# Patient Record
Sex: FEMALE | Race: AMERICAN INDIAN OR ALASKA NATIVE
[De-identification: names, ages, dates, MRNs, and addresses within clinical notes are randomized per-mention and may not be internally consistent; named-entity substitution may affect disease eponyms.]

---

## 2020-01-09 ENCOUNTER — HOSPITAL ENCOUNTER (EMERGENCY)
Dept: HOSPITAL 5 - ED | Age: 40
Discharge: HOME | End: 2020-01-09
Payer: SELF-PAY

## 2020-01-09 VITALS — DIASTOLIC BLOOD PRESSURE: 108 MMHG | SYSTOLIC BLOOD PRESSURE: 163 MMHG

## 2020-01-09 DIAGNOSIS — H66.92: Primary | ICD-10-CM

## 2020-01-09 DIAGNOSIS — H02.9: ICD-10-CM

## 2020-01-09 PROCEDURE — 99282 EMERGENCY DEPT VISIT SF MDM: CPT

## 2020-01-09 NOTE — EMERGENCY DEPARTMENT REPORT
ED ENT HPI





- General


Chief complaint: Sore Throat


Stated complaint: SORE THROAT/EARACHE


Time Seen by Provider: 20 10:32


Source: patient


Mode of arrival: Ambulatory


Limitations: No Limitations





- History of Present Illness


Initial comments: 





This is a 39-year-old female brought by mother nontoxic, well nourished in 

appearance, no acute signs of distress presents to the ED with c/o of left 

earache and sore throat.   Patient describes sore throat as swallowing razer 

blades.   Patient denies any ear drainage.  Patient denies any trauma to the 

area.  Patient denies any mastoid tenderness or tragus tenderness.  Patient 

denies any drooling or hoarseness.  Patient denies hearing decrease or hearing 

changes.  Patient denies any fever, chills, nausea, vomiting, chest pain, short 

of breath, headache or stiff neck.  Patient denies any drug allergies or 

significant past medical history.


MD complaint: sore throat, ear pain


-: days(s)


Location: L ear, throat


Severity: mild


Severity scale (0 -10): 8


Quality: aching


Consistency: constant


Improves with: none


Worsens with: swallowing


Associated Symptoms: pain with swallowing, sore throat.  denies: fever, cough, 

gum swelling, toothache, tinnitus, hearing loss, discharge from ear, rhinorrhea





- Related Data


                                  Previous Rx's











 Medication  Instructions  Recorded  Last Taken  Type


 


Amoxicillin [Amoxicillin TAB] 875 mg PO BID #20 tablet 20 Unknown Rx


 


Ibuprofen [Motrin] 600 mg PO Q8H PRN #20 tablet 20 Unknown Rx











                                    Allergies











Allergy/AdvReac Type Severity Reaction Status Date / Time


 


No Known Allergies Allergy   Unverified 20 09:53














ED Dental HPI





- General


Chief complaint: Sore Throat


Stated complaint: SORE THROAT/EARACHE


Time Seen by Provider: 20 10:32


Source: patient


Mode of arrival: Ambulatory


Limitations: No Limitations





- Related Data


                                  Previous Rx's











 Medication  Instructions  Recorded  Last Taken  Type


 


Amoxicillin [Amoxicillin TAB] 875 mg PO BID #20 tablet 20 Unknown Rx


 


Ibuprofen [Motrin] 600 mg PO Q8H PRN #20 tablet 20 Unknown Rx











                                    Allergies











Allergy/AdvReac Type Severity Reaction Status Date / Time


 


No Known Allergies Allergy   Unverified 20 09:53














ED Review of Systems


ROS: 


Stated complaint: SORE THROAT/EARACHE


Other details as noted in HPI





Constitutional: denies: chills, fever


Eyes: denies: eye pain, eye discharge, vision change


ENT: ear pain, throat pain


Respiratory: denies: cough, shortness of breath, wheezing


Cardiovascular: denies: chest pain, palpitations


Endocrine: no symptoms reported


Gastrointestinal: denies: abdominal pain, nausea, diarrhea


Genitourinary: denies: urgency, dysuria, discharge


Musculoskeletal: denies: back pain, joint swelling, arthralgia


Skin: denies: rash, lesions


Neurological: denies: headache, weakness, paresthesias


Psychiatric: denies: anxiety, depression


Hematological/Lymphatic: denies: easy bleeding, easy bruising





ED Past Medical Hx





- Past Medical History


Previous Medical History?: No





- Surgical History


Additional Surgical History: 





- Social History


Smoking Status: Never Smoker


Substance Use Type: None





- Medications


Home Medications: 


                                Home Medications











 Medication  Instructions  Recorded  Confirmed  Last Taken  Type


 


Amoxicillin [Amoxicillin TAB] 875 mg PO BID #20 tablet 20  Unknown Rx


 


Ibuprofen [Motrin] 600 mg PO Q8H PRN #20 tablet 20  Unknown Rx














ED Physical Exam





- General


Limitations: No Limitations


General appearance: alert, in no apparent distress





- Head


Head exam: Present: atraumatic, normocephalic





- Expanded ENT Exam


  ** Expanded


Ear exam: Present: normal external inspection


TM/Canal exam: Erythema: Left TM, Bulging: Left TM


Mouth exam: Present: normal external inspection.  Absent: drooling, trismus, 

muffled voice


Teeth exam: Present: normal inspection


Throat exam: Positive: tonsillar erythema, other (uvula midline).  Negative: 

tonsillomegaly, tonsillar exudate, R peritonsillar mass, L peritonsillar mass





- Neck


Neck exam: Present: normal inspection, full ROM.  Absent: tenderness, 

meningismus, lymphadenopathy





- Respiratory


Respiratory exam: Present: normal lung sounds bilaterally.  Absent: respiratory 

distress, wheezes, rales, rhonchi, stridor, chest wall tenderness, accessory 

muscle use, decreased breath sounds, prolonged expiratory





- Cardiovascular


Cardiovascular Exam: Present: regular rate, normal rhythm, normal heart sounds. 

 Absent: irregular rhythm, systolic murmur, diastolic murmur, rubs, gallop





- Extremities Exam


Extremities exam: Present: normal inspection, full ROM





- Back Exam


Back exam: Present: normal inspection, full ROM





- Neurological Exam


Neurological exam: Present: alert, oriented X3, normal gait





- Psychiatric


Psychiatric exam: Present: normal affect, normal mood





- Skin


Skin exam: Present: warm, dry, intact, normal color.  Absent: rash





ED Course





                                   Vital Signs











  20





  09:51 10:04


 


Temperature 98.7 F 98.7 F


 


Pulse Rate 102 H 96 H


 


Respiratory 19 16





Rate  


 


Blood Pressure 183/115 163/108


 


O2 Sat by Pulse 100 100





Oximetry  














- Reevaluation(s)


Reevaluation #1: 





20 11:29


Patient is speaking in full sentences with no signs of distress noted.





ED Medical Decision Making





- Medical Decision Making





Patient was instructed to Follow-up with a primary care doctor in 3-5 days or if

 symptoms worsen and continue return to emergency room as soon as possible.  At 

time of discharge, the patient does not seem toxic or ill in appearance.  No 

acute signs of distress noted.  Patient agrees to discharge treatment plan of 

care.  No further questions noted by the patient.


Critical care attestation.: 


If time is entered above; I have spent that time in minutes in the direct care 

of this critically ill patient, excluding procedure time.








ED Disposition


Clinical Impression: 


Pharyngitis


Qualifiers:


 Pharyngitis/tonsillitis etiology: unspecified etiology Qualified Code(s): J02.9

 - Acute pharyngitis, unspecified





Left otitis media


Qualifiers:


 Otitis media type: unspecified Qualified Code(s): H66.92 - Otitis media, 

unspecified, left ear





Disposition: - TO HOME OR SELFCARE


Is pt being admited?: No


Does the pt Need Aspirin: No


Condition: Stable


Instructions:  Pharyngitis (ED), Otitis Media (ED)


Additional Instructions: 


Follow-up with a primary care doctor in 3-5 days or if symptoms worsen and 

continue return to emergency room as soon as possible. 


Prescriptions: 


Amoxicillin [Amoxicillin TAB] 875 mg PO BID #20 tablet


Ibuprofen [Motrin] 600 mg PO Q8H PRN #20 tablet


 PRN Reason: Pain


Referrals: 


PRIMARY MD MELIDA [Primary Care Provider] - 3-5 Days


KRISTYN LAINEZ MD [Staff Physician] - 3-5 Days


[facility] [Outside] - 3-5 Days


Forms:  Work/School Release Form(ED)